# Patient Record
Sex: MALE | URBAN - METROPOLITAN AREA
[De-identification: names, ages, dates, MRNs, and addresses within clinical notes are randomized per-mention and may not be internally consistent; named-entity substitution may affect disease eponyms.]

---

## 2023-01-04 ENCOUNTER — TELEPHONE ENCOUNTER (OUTPATIENT)
Dept: URBAN - METROPOLITAN AREA CLINIC 35 | Facility: CLINIC | Age: 43
End: 2023-01-04

## 2023-01-04 ENCOUNTER — OFFICE VISIT (OUTPATIENT)
Dept: URBAN - METROPOLITAN AREA CLINIC 33 | Facility: CLINIC | Age: 43
End: 2023-01-04

## 2023-01-04 NOTE — HPI-VOMITING
Patient admits (recent onset/longstanding history) of vomiting. The onset was --- ago. Patient admit/denies to associated symptoms such as nausea, belching, loss of appetite, dizziness, faintness, dry mouth, diarrhea, fever, abdominal pain, bloating, gas and decreased urination. Patient currently admits/denies __ soft/formed/watery bowel movements per day. Patient ___ blood, mucus, or melena in stools.  Patient notes ____ aggravating factors and ___ alleviating factors and has tried __ medications with __ relief of symptoms.  Patient admits/denies hematemesis, coffee-ground emesis, melena, or rectal bleeding.  Patient admits/denies past EGD or colonoscopy. Patient admits/denies family hx of gastric/esophageal cancer or diseases.

## 2023-01-04 NOTE — HPI-DIARRHEA
42 year old male patient presents today for change in bowel habits. Patient admits (recent onset/longstanding history) of (diarrhea/constipation/change in bowel habits/varying bowel habits). Onset was --. Patient currently admits -- bowel movements per --, with/without strain. Stools are --. Admits/Denies any blood, mucus, or melena in stools. He/She admits/denies any pruritus ani or rectal pain. Patient admits/denies associated abdominal pains, bloating, and gas. When patient has a bowel movement he does/does not feel like he is completely emptying his/her bowels. Denies/Admits fecal incontinence (conscious/unconscious).

## 2023-01-04 NOTE — HPI-WEIGHT LOSS
Admits to weight loss. Patient has lost -- pounds in -- weeks/months. Admits/denies any dietary changes or changes in appetite.

## 2023-01-05 ENCOUNTER — TELEPHONE ENCOUNTER (OUTPATIENT)
Dept: URBAN - METROPOLITAN AREA CLINIC 33 | Facility: CLINIC | Age: 43
End: 2023-01-05